# Patient Record
Sex: MALE | Race: WHITE | Employment: OTHER | URBAN - METROPOLITAN AREA
[De-identification: names, ages, dates, MRNs, and addresses within clinical notes are randomized per-mention and may not be internally consistent; named-entity substitution may affect disease eponyms.]

---

## 2016-01-01 PROCEDURE — G9678 ONCOLOGY CARE MODEL SERVICE: HCPCS | Performed by: INTERNAL MEDICINE

## 2017-01-01 ENCOUNTER — TELEPHONE (OUTPATIENT)
Dept: INTERNAL MEDICINE CLINIC | Facility: CLINIC | Age: 82
End: 2017-01-01

## 2017-01-01 ENCOUNTER — OFFICE VISIT (OUTPATIENT)
Dept: HEMATOLOGY/ONCOLOGY | Facility: HOSPITAL | Age: 82
End: 2017-01-01
Attending: INTERNAL MEDICINE
Payer: MEDICARE

## 2017-01-01 ENCOUNTER — APPOINTMENT (OUTPATIENT)
Dept: CT IMAGING | Facility: HOSPITAL | Age: 82
End: 2017-01-01
Attending: UROLOGY
Payer: MEDICARE

## 2017-01-01 ENCOUNTER — TELEPHONE (OUTPATIENT)
Dept: PULMONOLOGY | Facility: CLINIC | Age: 82
End: 2017-01-01

## 2017-01-01 ENCOUNTER — TELEPHONE (OUTPATIENT)
Dept: HEMATOLOGY/ONCOLOGY | Facility: HOSPITAL | Age: 82
End: 2017-01-01

## 2017-01-01 ENCOUNTER — APPOINTMENT (OUTPATIENT)
Dept: GENERAL RADIOLOGY | Facility: HOSPITAL | Age: 82
End: 2017-01-01
Attending: EMERGENCY MEDICINE
Payer: MEDICARE

## 2017-01-01 ENCOUNTER — OFFICE VISIT (OUTPATIENT)
Dept: PULMONOLOGY | Facility: CLINIC | Age: 82
End: 2017-01-01

## 2017-01-01 ENCOUNTER — HOSPITAL ENCOUNTER (OUTPATIENT)
Dept: NUCLEAR MEDICINE | Facility: HOSPITAL | Age: 82
Discharge: HOME | End: 2017-01-01
Attending: INTERNAL MEDICINE
Payer: MEDICARE

## 2017-01-01 ENCOUNTER — HOSPITAL ENCOUNTER (EMERGENCY)
Facility: HOSPITAL | Age: 82
Discharge: HOME | End: 2017-01-01
Attending: EMERGENCY MEDICINE
Payer: MEDICARE

## 2017-01-01 ENCOUNTER — OFFICE VISIT (OUTPATIENT)
Dept: HEMATOLOGY/ONCOLOGY | Facility: HOSPITAL | Age: 82
End: 2017-01-01
Attending: UROLOGY
Payer: MEDICARE

## 2017-01-01 ENCOUNTER — SNF/IP PROF CHARGE ONLY (OUTPATIENT)
Dept: HEMATOLOGY/ONCOLOGY | Facility: HOSPITAL | Age: 82
End: 2017-01-01

## 2017-01-01 ENCOUNTER — HOSPITAL ENCOUNTER (OUTPATIENT)
Dept: MRI IMAGING | Facility: HOSPITAL | Age: 82
Discharge: HOME | End: 2017-01-01
Attending: INTERNAL MEDICINE
Payer: MEDICARE

## 2017-01-01 VITALS
BODY MASS INDEX: 26.2 KG/M2 | SYSTOLIC BLOOD PRESSURE: 127 MMHG | DIASTOLIC BLOOD PRESSURE: 67 MMHG | TEMPERATURE: 98 F | OXYGEN SATURATION: 99 % | HEART RATE: 70 BPM | WEIGHT: 163 LBS | HEIGHT: 66 IN | RESPIRATION RATE: 14 BRPM

## 2017-01-01 VITALS
BODY MASS INDEX: 23.8 KG/M2 | HEIGHT: 71 IN | OXYGEN SATURATION: 96 % | WEIGHT: 170 LBS | SYSTOLIC BLOOD PRESSURE: 137 MMHG | HEART RATE: 62 BPM | DIASTOLIC BLOOD PRESSURE: 64 MMHG | RESPIRATION RATE: 16 BRPM | TEMPERATURE: 97 F

## 2017-01-01 VITALS
OXYGEN SATURATION: 96 % | HEART RATE: 73 BPM | DIASTOLIC BLOOD PRESSURE: 73 MMHG | RESPIRATION RATE: 18 BRPM | HEIGHT: 66.5 IN | SYSTOLIC BLOOD PRESSURE: 119 MMHG

## 2017-01-01 VITALS
DIASTOLIC BLOOD PRESSURE: 48 MMHG | RESPIRATION RATE: 18 BRPM | SYSTOLIC BLOOD PRESSURE: 133 MMHG | TEMPERATURE: 98 F | HEART RATE: 79 BPM

## 2017-01-01 VITALS
TEMPERATURE: 98 F | SYSTOLIC BLOOD PRESSURE: 155 MMHG | RESPIRATION RATE: 16 BRPM | HEART RATE: 63 BPM | DIASTOLIC BLOOD PRESSURE: 55 MMHG

## 2017-01-01 DIAGNOSIS — I95.1 ORTHOSTASIS: Primary | ICD-10-CM

## 2017-01-01 DIAGNOSIS — N30.00 ACUTE CYSTITIS WITHOUT HEMATURIA: ICD-10-CM

## 2017-01-01 DIAGNOSIS — Z09 CHEMOTHERAPY FOLLOW-UP EXAMINATION: Primary | ICD-10-CM

## 2017-01-01 DIAGNOSIS — C34.91 SCLC (SMALL CELL LUNG CARCINOMA), RIGHT (HCC): ICD-10-CM

## 2017-01-01 DIAGNOSIS — C78.7 METASTASES TO THE LIVER (HCC): Primary | ICD-10-CM

## 2017-01-01 DIAGNOSIS — C34.90 METASTATIC LUNG CANCER (METASTASIS FROM LUNG TO OTHER SITE), UNSPECIFIED LATERALITY (HCC): ICD-10-CM

## 2017-01-01 DIAGNOSIS — C34.90 SCLC (SMALL CELL LUNG CARCINOMA), UNSPECIFIED LATERALITY (HCC): ICD-10-CM

## 2017-01-01 DIAGNOSIS — C34.90 SCLC (SMALL CELL LUNG CARCINOMA), UNSPECIFIED LATERALITY (HCC): Primary | ICD-10-CM

## 2017-01-01 DIAGNOSIS — C78.7 METASTASES TO THE LIVER (HCC): ICD-10-CM

## 2017-01-01 DIAGNOSIS — C79.31 BRAIN METASTASES (HCC): ICD-10-CM

## 2017-01-01 DIAGNOSIS — C78.7 LIVER METASTASES (HCC): ICD-10-CM

## 2017-01-01 DIAGNOSIS — R53.1 WEAKNESS GENERALIZED: ICD-10-CM

## 2017-01-01 DIAGNOSIS — N30.01 ACUTE CYSTITIS WITH HEMATURIA: ICD-10-CM

## 2017-01-01 DIAGNOSIS — E86.0 DEHYDRATION: Primary | ICD-10-CM

## 2017-01-01 DIAGNOSIS — J44.9 CHRONIC OBSTRUCTIVE PULMONARY DISEASE, UNSPECIFIED COPD TYPE (HCC): ICD-10-CM

## 2017-01-01 DIAGNOSIS — C34.90 SMALL CELL LUNG CANCER, UNSPECIFIED LATERALITY (HCC): Primary | ICD-10-CM

## 2017-01-01 LAB
ANION GAP SERPL CALC-SCNC: 12 MMOL/L (ref 0–18)
ANION GAP SERPL CALC-SCNC: 8 MMOL/L (ref 0–18)
BASOPHILS # BLD: 0.1 K/UL (ref 0–0.2)
BASOPHILS # BLD: 0.1 K/UL (ref 0–0.2)
BASOPHILS NFR BLD: 1 %
BASOPHILS NFR BLD: 1 %
BILIRUB UR QL: NEGATIVE
BILIRUB UR QL: NEGATIVE
BUN SERPL-MCNC: 20 MG/DL (ref 8–20)
BUN SERPL-MCNC: 21 MG/DL (ref 8–20)
BUN/CREAT SERPL: 16.8 (ref 10–20)
BUN/CREAT SERPL: 16.8 (ref 10–20)
CALCIUM SERPL-MCNC: 9.1 MG/DL (ref 8.5–10.5)
CALCIUM SERPL-MCNC: 9.8 MG/DL (ref 8.5–10.5)
CHLORIDE SERPL-SCNC: 103 MMOL/L (ref 95–110)
CHLORIDE SERPL-SCNC: 99 MMOL/L (ref 95–110)
CO2 SERPL-SCNC: 23 MMOL/L (ref 22–32)
CO2 SERPL-SCNC: 24 MMOL/L (ref 22–32)
COLOR UR: YELLOW
COLOR UR: YELLOW
CREAT SERPL-MCNC: 1.19 MG/DL (ref 0.5–1.5)
CREAT SERPL-MCNC: 1.25 MG/DL (ref 0.5–1.5)
EOSINOPHIL # BLD: 0.2 K/UL (ref 0–0.7)
EOSINOPHIL # BLD: 0.2 K/UL (ref 0–0.7)
EOSINOPHIL NFR BLD: 3 %
EOSINOPHIL NFR BLD: 5 %
ERYTHROCYTE [DISTWIDTH] IN BLOOD BY AUTOMATED COUNT: 16.2 % (ref 11–15)
ERYTHROCYTE [DISTWIDTH] IN BLOOD BY AUTOMATED COUNT: 16.7 % (ref 11–15)
GLUCOSE BLDC GLUCOMTR-MCNC: 81 MG/DL (ref 70–99)
GLUCOSE SERPL-MCNC: 107 MG/DL (ref 70–99)
GLUCOSE SERPL-MCNC: 118 MG/DL (ref 70–99)
GLUCOSE UR-MCNC: NEGATIVE MG/DL
GLUCOSE UR-MCNC: NEGATIVE MG/DL
HCT VFR BLD AUTO: 33.7 % (ref 41–52)
HCT VFR BLD AUTO: 37.1 % (ref 41–52)
HGB BLD-MCNC: 11.3 G/DL (ref 13.5–17.5)
HGB BLD-MCNC: 12.4 G/DL (ref 13.5–17.5)
HGB UR QL STRIP.AUTO: NEGATIVE
KETONES UR-MCNC: 20 MG/DL
KETONES UR-MCNC: NEGATIVE MG/DL
LYMPHOCYTES # BLD: 0.7 K/UL (ref 1–4)
LYMPHOCYTES # BLD: 0.9 K/UL (ref 1–4)
LYMPHOCYTES NFR BLD: 15 %
LYMPHOCYTES NFR BLD: 15 %
MAGNESIUM SERPL-MCNC: 1.7 MG/DL (ref 1.8–2.5)
MCH RBC QN AUTO: 30.7 PG (ref 27–32)
MCH RBC QN AUTO: 31.2 PG (ref 27–32)
MCHC RBC AUTO-ENTMCNC: 33.4 G/DL (ref 32–37)
MCHC RBC AUTO-ENTMCNC: 33.5 G/DL (ref 32–37)
MCV RBC AUTO: 91.7 FL (ref 80–100)
MCV RBC AUTO: 93.3 FL (ref 80–100)
MONOCYTES # BLD: 0.4 K/UL (ref 0–1)
MONOCYTES # BLD: 0.5 K/UL (ref 0–1)
MONOCYTES NFR BLD: 10 %
MONOCYTES NFR BLD: 7 %
NEUTROPHILS # BLD AUTO: 3.4 K/UL (ref 1.8–7.7)
NEUTROPHILS # BLD AUTO: 4.5 K/UL (ref 1.8–7.7)
NEUTROPHILS NFR BLD: 69 %
NEUTROPHILS NFR BLD: 74 %
NITRITE UR QL STRIP.AUTO: NEGATIVE
NITRITE UR QL STRIP.AUTO: POSITIVE
OSMOLALITY UR CALC.SUM OF ELEC: 275 MOSM/KG (ref 275–295)
OSMOLALITY UR CALC.SUM OF ELEC: 290 MOSM/KG (ref 275–295)
PH UR: 6 [PH] (ref 5–8)
PH UR: 6 [PH] (ref 5–8)
PLATELET # BLD AUTO: 241 K/UL (ref 140–400)
PLATELET # BLD AUTO: 245 K/UL (ref 140–400)
PMV BLD AUTO: 8.3 FL (ref 7.4–10.3)
PMV BLD AUTO: 9.1 FL (ref 7.4–10.3)
POTASSIUM SERPL-SCNC: 3.3 MMOL/L (ref 3.3–5.1)
POTASSIUM SERPL-SCNC: 4 MMOL/L (ref 3.3–5.1)
PROT UR-MCNC: 100 MG/DL
PROT UR-MCNC: 100 MG/DL
RBC # BLD AUTO: 3.68 M/UL (ref 4.5–5.9)
RBC # BLD AUTO: 3.98 M/UL (ref 4.5–5.9)
RBC #/AREA URNS AUTO: 17 /HPF
RBC #/AREA URNS AUTO: 37 /HPF
SODIUM SERPL-SCNC: 131 MMOL/L (ref 136–144)
SODIUM SERPL-SCNC: 138 MMOL/L (ref 136–144)
SP GR UR STRIP: 1.01 (ref 1–1.03)
SP GR UR STRIP: 1.02 (ref 1–1.03)
TROPONIN I SERPL-MCNC: 0.02 NG/ML (ref ?–0.03)
UROBILINOGEN UR STRIP-ACNC: <2
UROBILINOGEN UR STRIP-ACNC: <2
VIT C UR-MCNC: 20 MG/DL
VIT C UR-MCNC: NEGATIVE MG/DL
WBC # BLD AUTO: 4.9 K/UL (ref 4–11)
WBC # BLD AUTO: 6.1 K/UL (ref 4–11)
WBC #/AREA URNS AUTO: 224 /HPF
WBC #/AREA URNS AUTO: 620 /HPF

## 2017-01-01 PROCEDURE — 85025 COMPLETE CBC W/AUTO DIFF WBC: CPT | Performed by: EMERGENCY MEDICINE

## 2017-01-01 PROCEDURE — 87086 URINE CULTURE/COLONY COUNT: CPT | Performed by: EMERGENCY MEDICINE

## 2017-01-01 PROCEDURE — 93010 ELECTROCARDIOGRAM REPORT: CPT | Performed by: EMERGENCY MEDICINE

## 2017-01-01 PROCEDURE — A9575 INJ GADOTERATE MEGLUMI 0.1ML: HCPCS | Performed by: INTERNAL MEDICINE

## 2017-01-01 PROCEDURE — 96361 HYDRATE IV INFUSION ADD-ON: CPT

## 2017-01-01 PROCEDURE — 99215 OFFICE O/P EST HI 40 MIN: CPT | Performed by: INTERNAL MEDICINE

## 2017-01-01 PROCEDURE — 78815 PET IMAGE W/CT SKULL-THIGH: CPT

## 2017-01-01 PROCEDURE — 51702 INSERT TEMP BLADDER CATH: CPT

## 2017-01-01 PROCEDURE — 99214 OFFICE O/P EST MOD 30 MIN: CPT | Performed by: INTERNAL MEDICINE

## 2017-01-01 PROCEDURE — 96360 HYDRATION IV INFUSION INIT: CPT

## 2017-01-01 PROCEDURE — G0463 HOSPITAL OUTPT CLINIC VISIT: HCPCS | Performed by: INTERNAL MEDICINE

## 2017-01-01 PROCEDURE — 99284 EMERGENCY DEPT VISIT MOD MDM: CPT

## 2017-01-01 PROCEDURE — 93005 ELECTROCARDIOGRAM TRACING: CPT

## 2017-01-01 PROCEDURE — 80048 BASIC METABOLIC PNL TOTAL CA: CPT | Performed by: EMERGENCY MEDICINE

## 2017-01-01 PROCEDURE — 82962 GLUCOSE BLOOD TEST: CPT

## 2017-01-01 PROCEDURE — 81001 URINALYSIS AUTO W/SCOPE: CPT | Performed by: EMERGENCY MEDICINE

## 2017-01-01 PROCEDURE — 84484 ASSAY OF TROPONIN QUANT: CPT | Performed by: EMERGENCY MEDICINE

## 2017-01-01 PROCEDURE — 71010 XR CHEST AP PORTABLE  (CPT=71010): CPT

## 2017-01-01 PROCEDURE — 99285 EMERGENCY DEPT VISIT HI MDM: CPT

## 2017-01-01 PROCEDURE — 70553 MRI BRAIN STEM W/O & W/DYE: CPT

## 2017-01-01 PROCEDURE — 83735 ASSAY OF MAGNESIUM: CPT | Performed by: EMERGENCY MEDICINE

## 2017-01-01 RX ORDER — POLYETHYLENE GLYCOL 3350 17 G/17G
17 POWDER, FOR SOLUTION ORAL DAILY
COMMUNITY

## 2017-01-01 RX ORDER — AMOXICILLIN 250 MG
2 CAPSULE ORAL DAILY
Qty: 20 TABLET | Refills: 0 | Status: SHIPPED | OUTPATIENT
Start: 2017-01-01 | End: 2017-01-01

## 2017-01-01 RX ORDER — FLUTICASONE FUROATE AND VILANTEROL TRIFENATATE 100; 25 UG/1; UG/1
1 POWDER RESPIRATORY (INHALATION) DAILY
Qty: 1 EACH | Refills: 5 | Status: SHIPPED | OUTPATIENT
Start: 2017-01-01

## 2017-01-01 RX ORDER — FINASTERIDE 5 MG/1
TABLET, FILM COATED ORAL
Refills: 4 | COMMUNITY
Start: 2017-01-01

## 2017-01-01 RX ORDER — LEVOFLOXACIN 500 MG/1
500 TABLET, FILM COATED ORAL DAILY
Qty: 7 TABLET | Refills: 0 | Status: SHIPPED | OUTPATIENT
Start: 2017-01-01 | End: 2017-01-01

## 2017-01-01 RX ORDER — HEPARIN SODIUM (PORCINE) LOCK FLUSH IV SOLN 100 UNIT/ML 100 UNIT/ML
SOLUTION INTRAVENOUS
Status: COMPLETED
Start: 2017-01-01 | End: 2017-01-01

## 2017-01-01 RX ORDER — 0.9 % SODIUM CHLORIDE 0.9 %
VIAL (ML) INJECTION
Status: DISCONTINUED
Start: 2017-01-01 | End: 2017-01-01

## 2017-01-01 RX ORDER — HEPARIN SODIUM (PORCINE) LOCK FLUSH IV SOLN 100 UNIT/ML 100 UNIT/ML
5 SOLUTION INTRAVENOUS ONCE
Status: COMPLETED | OUTPATIENT
Start: 2017-01-01 | End: 2017-01-01

## 2017-01-01 RX ORDER — CIPROFLOXACIN 500 MG/1
500 TABLET, FILM COATED ORAL 2 TIMES DAILY
Qty: 20 TABLET | Refills: 0 | Status: SHIPPED | OUTPATIENT
Start: 2017-01-01 | End: 2017-01-01

## 2017-01-01 RX ORDER — SODIUM CHLORIDE 9 MG/ML
INJECTION, SOLUTION INTRAVENOUS
Status: COMPLETED
Start: 2017-01-01 | End: 2017-01-01

## 2017-01-01 RX ORDER — ALBUTEROL SULFATE 90 UG/1
AEROSOL, METERED RESPIRATORY (INHALATION)
Qty: 1 INHALER | Refills: 5 | Status: SHIPPED | OUTPATIENT
Start: 2017-01-01

## 2017-01-01 RX ADMIN — SODIUM CHLORIDE 1000 ML: 9 INJECTION, SOLUTION INTRAVENOUS at 11:14:00

## 2017-01-01 RX ADMIN — HEPARIN SODIUM (PORCINE) LOCK FLUSH IV SOLN 100 UNIT/ML 500 UNITS: 100 SOLUTION INTRAVENOUS at 13:19:00

## 2017-01-06 NOTE — ED NOTES
Patient c/o n/v/d with 2 episodes of vomiting today, per  called for sob- family at bedside states patient began to c/o sob and they attempted to start breathing tx but neb machine not working.   Patient does not appear to be in respiratory distress

## 2017-01-06 NOTE — ED NOTES
D/C INSTRUCTIONS AND PRESCRIPTIONS GIVEN TO PT and PTs daughter. STATES VERBAL UNDERSTANDING. INSTRUCTED TO F/U WITH MD AS DIRECTED. HAS NO FURTHER QUESTIONS.

## 2017-01-06 NOTE — ED PROVIDER NOTES
Patient Seen in: [facility] Emergency Department    History   Patient presents with:  Nausea/Vomiting/Diarrhea (gastrointestinal)    Stated Complaint: Diarrhea    HPI    79-year-old male just released from rehab approximately 3 days ago after long Besylate 10 MG Oral Tab,  Take 0.5 tablets (5 mg total) by mouth as needed (SBP>130).    MetFORMIN HCl 500 MG Oral Tab,  Take 500 mg by mouth daily with breakfast. Take as needed   Levothyroxine Sodium 150 MCG Oral Tab,  TAKE 1 TABLET BY MOUTH ON AN EMPTY S reviewed. All other systems reviewed and negative except as noted above. PSFH elements reviewed from today and agreed except as otherwise stated in HPI.     Physical Exam       ED Triage Vitals   BP 01/06/17 0149 147/67 mmHg   Pulse 01/06/17 0149 64 (*)     Bacteria Urine Moderate (*)     Yeast Urine Many (*)     All other components within normal limits   MAGNESIUM - Abnormal; Notable for the following:     Magnesium 1.7 (*)     All other components within normal limits   CBC W/ DIFFERENTIAL - Abnorm home discharge and they will call the primary care doctor today. They are planning on getting a caregiver to come in her place the patient at home for more long-term care.         Disposition and Plan     Clinical Impression:  Orthostasis  (primary encount

## 2017-01-09 NOTE — TELEPHONE ENCOUNTER
[address] [place] [address] discharge summary from 12/30/2016 to Dr. Demarcus Valdez [phone]

## 2017-01-11 NOTE — PROGRESS NOTES
HPI:    Patient ID: Nba Pruett. is a 80year old male.     HPI  Weak / progressive LE weakness last 6-7 months   breathinh about the same / no sob at rest and his activity is limited anyway   No cough or sputum   No f/c   Chronic shultz   No hemop Tab Take 20 mg by mouth nightly. Disp:  Rfl:    aspirin 81 MG Oral Tab Take 81 mg by mouth daily. Disp:  Rfl:    Cholecalciferol (VITAMIN D) 2000 UNITS Oral Cap Take  by mouth. Disp:  Rfl:    latanoprost (XALATAN) 0.005 % Ophthalmic Solution nightly.  Disp:

## 2017-01-14 NOTE — ED PROVIDER NOTES
Patient Seen in: [facility] Emergency Department    History   Patient presents with:  Fatigue (constitutional, neurologic)      HPI    The patient presents complaining of generalized weakness.   Per his family, symptoms have worsened in the last se Relation Age of Onset   • Other[other] [OTHER] Father    • Dementia Father    • Diabetes Mother    • Other[other] [OTHER] Brother    • Dementia Brother    • Dementia Sister    • Heart Disease Son    • Diabetes Daughter        Smoking Status: Social History Appears weak   HENT:   Head: Normocephalic and atraumatic. Nose: Nose normal.   Eyes: Conjunctivae are normal. Right eye exhibits no discharge. Left eye exhibits no discharge. Neck: Normal range of motion. Neck supple. Cardiovascular: Normal rate. individual orders.    RAINBOW DRAW BLUE   RAINBOW DRAW GOLD   RAINBOW DRAW LAVENDER   RAINBOW DRAW LIGHT GREEN   RAINBOW DRAW DARK GREEN   RAINBOW DRAW LAVENDER TALL (BNP)   URINE CULTURE, ROUTINE   URINE CULTURE, ROUTINE      EKG    Rate, intervals and axe 500 MG Oral Tab  Take 1 tablet (500 mg total) by mouth 2 (two) times daily.   Qty: 20 tablet Refills: 0

## 2017-01-16 NOTE — TELEPHONE ENCOUNTER
Per Dr. Wild Barnes pt to continue taking Breo, ok to send in refill. Pt daughter informed, pharmacy verified, rx sent.

## 2017-01-16 NOTE — TELEPHONE ENCOUNTER
They called to cancel follow up for today. Dad was in ER over weekend, has UTI and is too weak today, he is on antibiotics.   I suggested that since he's not feeling well it may be good to see , she said PCP suggested letting him rest, possibly having IV

## 2017-01-17 NOTE — TELEPHONE ENCOUNTER
Returned daughters call will discuss with Dr. Marli Perez regarding the recommendation for IV hydration.

## 2017-01-17 NOTE — TELEPHONE ENCOUNTER
Jason Langford called about the hydration for her father that he is to have at the infusion center.  Please advise

## 2017-01-17 NOTE — TELEPHONE ENCOUNTER
Petrona Gross stts pt needs refill of Ventolin b/c refills . She stts she is at the pharmacy now. Confirmed pharmacy. V/o received from Dr. Sheng Moreno to refill Ventolin. Explained rx will be sent to pharmacy now. She verbalized understanding.  Rx sent to [facility]

## 2017-01-18 NOTE — PROGRESS NOTES
SCLC (small cell lung carcinoma) ([facility] [address]    11/2/2015 Biopsy Bronchoscopy with biopsy for abnormalty seen on August 18 2015 CT scan. Negative for malignancy.      2/3/2016 Initial Diagnosis SCLC (small cell lung carcinoma, extensive stage) ([facility]    2/3/201 and leg swelling. Gastrointestinal: Positive for nausea and constipation. Negative for vomiting, abdominal pain, diarrhea, blood in stool and abdominal distention. Endocrine: Negative for cold intolerance.    Genitourinary: Positive for difficulty urina Clopidogrel Bisulfate (PLAVIX) 75 MG Oral Tab Take 75 mg by mouth daily. Disp:  Rfl:    Escitalopram Oxalate (LEXAPRO) 20 MG Oral Tab Take 10 mg by mouth daily. Disp:  Rfl:    allopurinol (ZYLOPRIM) 300 MG Oral Tab Take 300 mg by mouth daily.  To be marysol History  retired        Social History Main Topics   Smoking status: Former Smoker  0.50 Packs/Day  For 65.00 Years     Types: Cigarettes    Quit date: 02/02/2016    Smokeless tobacco: Never Used    Alcohol Use: No    Drug Use: No    Sexual A (right side): No submental and no submandibular adenopathy present. Head (left side): No submental and no submandibular adenopathy present. He has no cervical adenopathy. He has no axillary adenopathy.         Right: No inguinal and no suprac agreeable. Will refer pt to Hospice. All questions were answered to satisfaction. Emotional support was also provided. This is a visit of 40 minutes. Greater than 50% of the time was spent counseling and/or coordinating care.           No orders of the right posterior nasopharynx.   4. Question of esophagitis.      MRI brain:    FINDINGS:          CEREBRUM:     A ring enhancing focus measuring 6 x 7 x 6 mm is again seen within the floor of the anterior cranial fossa the mean for margin of the interhemisph

## 2017-01-18 NOTE — PROGRESS NOTES
Altered Fluid Balance    Altered fluid balance related to:    Fluid volume deficit - identified and monitoring  Goal:    Restore fluid balance - making progress    Interventions:    follow fluid balance protocol - assess cardiovascular status, evaluate r

## 2017-01-18 NOTE — PATIENT INSTRUCTIONS
IV hydration today.  [facility] nurse to have Hospice discussion with pt and family

## 2017-01-19 NOTE — TELEPHONE ENCOUNTER
Per daughter Yael Hernandes, don't order bed or anything else as Hospice will order everything they need and insurance will cover it. .   If you have any questions you can call her.   [phone]

## 2017-01-23 NOTE — TELEPHONE ENCOUNTER
Delio Beatrisjulius called and said she know her father is in Hospice and the doctor usually doesn't order things. But, she is requesting IV fluids.  Please advise

## 2017-01-23 NOTE — TELEPHONE ENCOUNTER
Called Ashely Mascorro back - she states her father appears comfortable but has not had much urine output and when he does it is very dark. She is requesting that hospice gives patient a liter of fluids. Called hospice and spoke with [facility] - supervisor.   Dr. Sherlyn Rowe

## 2017-02-01 ENCOUNTER — TELEPHONE (OUTPATIENT)
Dept: PULMONOLOGY | Facility: CLINIC | Age: 82
End: 2017-02-01

## 2017-02-01 NOTE — TELEPHONE ENCOUNTER
BRITT Pollock called to let Dr. Estelle Espinoza know that pt passed away last night. He states family was very grateful for Dr. Roberth terry and wanted him to know. No need to call unless further questions.

## 2017-02-01 NOTE — TELEPHONE ENCOUNTER
Msg left for Dr. Teresita Payton. Notification of Death form filled out and sent via interoffice mail to [employer] [facility] 2[facility].

## (undated) NOTE — ED AVS SNAPSHOT
[facility] Emergency Department    [address] [place] [address]    Phone:  [phone]    Fax:  [place].    MRN: [id number]    Department:  [facility] Emergency Department   Date of Visit side effects. Medication List      START taking these medications     levofloxacin 500 MG Tabs   Quantity:  7 tablet   Commonly known as:  LEVAQUIN   Take 1 tablet (500 mg total) by mouth daily.             Where to Get Your Medications      Yo discretion of that Physician.   If you need additional assistance selecting a physician, you may call the [employer] Physician Referral and Class Registration line at [phone] or find a doctor online by visiting [email or web address].    IF THE you to explore options for quitting.     - If you have concerns related to behavioral health issues or thoughts of harming yourself, contact [facility] at [phone].     - If you don’t have insurance, Manas Fernandes

## (undated) NOTE — MR AVS SNAPSHOT
[facility]  [address] [place] [address] [phone]               Thank you for choosing us for your health care visit with Clarisa Sewell. Sandra Dunham MD.  We are glad to serve you and happy to provide you with this summary of your visit. Take 0.5 mg by mouth daily. BREO ELLIPTA 100-25 MCG/INH Aepb   Generic drug:  Fluticasone Furoate-Vilanterol           Clopidogrel Bisulfate 75 MG Tabs   Take 75 mg by mouth daily.    Commonly known as:  PLAVIX           Fexofenadine HCl 180 MG Ta Support Staff. Remember, MyChart is NOT to be used for urgent needs. For medical emergencies, dial 911.            Visit [facility] online at  [email or web address]

## (undated) NOTE — MR AVS SNAPSHOT
Magan Blevins.   2017 11:30 AM   Office Visit   MRN:  [id number]    Description:  Male : 1934   Department:  [address] [place] [address]              Visit Summary      Primary Visit Diagnosis     Metastases to Support Staff. Remember, MyChart is NOT to be used for urgent needs. For medical emergencies, dial 911.

## (undated) NOTE — ED AVS SNAPSHOT
[facility] Emergency Department    [address] [place] [address]    Phone:  [phone]    Fax:  [place].    MRN: [id number]    Department:  [facility] Emergency Department   Date of Visit and Class Registration line at [phone] or find a doctor online by visiting [email or web address].    IF THERE IS ANY CHANGE OR WORSENING OF YOUR CONDITION, CALL YOUR PRIMARY CARE PHYSICIAN AT ONCE OR RETURN IMMEDIATELY TO [address] [place] [address].     If

## (undated) NOTE — ED AVS SNAPSHOT
[facility] Emergency Department    [address] [place] [address]    Phone:  [phone]    Fax:  [place].    MRN: [id number]    Department:  [facility] Emergency Department   Date of Visit doctor. Please ask your health care provider, pharmacist or nurse if you have any questions regarding your home medications, including potential side effects.               Medication List      START taking these medications     Ciprofloxacin HCl 500 MG Tab You were examined and treated today on an urgent basis only. This was not a substitute for ongoing medical care. Often, one Emergency Department visit does not uncover every injury or illness.  If you have been referred to a primary care or a specialist ph Maikel Chang [address] \[facility]\"" ([address]) [facility] ([facility] [address]) [phone]   [facility] [address] [employer]. (Mendez Rea) [address]  Marjan Moss Support Staff. Remember, MyChart is NOT to be used for urgent needs. For medical emergencies, dial 911.

## (undated) NOTE — MR AVS SNAPSHOT
Kike Mack.   2017 10:00 AM   Office Visit   MRN:  [id number]    Description:  Male : 1934   Provider:  Elizabeth Venegas   Department:  [facility]              Visit Summary      Allergies     Se latanoprost (XALATAN) 0.005 % Ophthalmic Solution nightly. Patient receiving Oncology Treatment         Patient Instructions    IV hydration today.  [facility] nurse to have Hospice discussion with pt and family       Please Note     Please arvind Now link in the [employer] Enter your Twined Activation Code exactly as it appears below along with your Zip Code and Date of Birth to complete the sign-up process. If you do not sign up before the expiration date, you must request a new code.     Damian Nicolas

## (undated) NOTE — ED AVS SNAPSHOT
[facility] Emergency Department    [facility] [address] [address]    Phone:  [phone]    Fax:  [place].    MRN: [id number]    Department:  [facility] Emergency Department   Date of Visit and Class Registration line at [phone] or find a doctor online by visiting [email or web address].    IF THERE IS ANY CHANGE OR WORSENING OF YOUR CONDITION, CALL YOUR PRIMARY CARE PHYSICIAN AT ONCE OR RETURN IMMEDIATELY TO [address] [place] [address].     If